# Patient Record
Sex: FEMALE | Race: WHITE
[De-identification: names, ages, dates, MRNs, and addresses within clinical notes are randomized per-mention and may not be internally consistent; named-entity substitution may affect disease eponyms.]

---

## 2019-10-07 ENCOUNTER — HOSPITAL ENCOUNTER (OUTPATIENT)
Dept: HOSPITAL 53 - M LAB REF | Age: 39
End: 2019-10-07
Attending: INTERNAL MEDICINE
Payer: COMMERCIAL

## 2019-10-07 DIAGNOSIS — R19.7: Primary | ICD-10-CM

## 2019-12-12 ENCOUNTER — HOSPITAL ENCOUNTER (OUTPATIENT)
Dept: HOSPITAL 53 - M LAB LCGH | Age: 39
End: 2019-12-12
Attending: OBSTETRICS & GYNECOLOGY
Payer: COMMERCIAL

## 2019-12-12 DIAGNOSIS — B37.3: ICD-10-CM

## 2019-12-12 DIAGNOSIS — Z80.49: Primary | ICD-10-CM

## 2020-07-11 ENCOUNTER — HOSPITAL ENCOUNTER (OUTPATIENT)
Dept: HOSPITAL 53 - M LABSMTC | Age: 40
End: 2020-07-11
Attending: PEDIATRICS
Payer: COMMERCIAL

## 2020-07-11 DIAGNOSIS — Z11.59: ICD-10-CM

## 2020-07-11 DIAGNOSIS — Z01.818: Primary | ICD-10-CM

## 2020-07-14 ENCOUNTER — HOSPITAL ENCOUNTER (OUTPATIENT)
Dept: HOSPITAL 53 - M OPP | Age: 40
Discharge: HOME | End: 2020-07-14
Attending: INTERNAL MEDICINE
Payer: COMMERCIAL

## 2020-07-14 VITALS — BODY MASS INDEX: 25.87 KG/M2 | HEIGHT: 63 IN | WEIGHT: 146 LBS

## 2020-07-14 VITALS — DIASTOLIC BLOOD PRESSURE: 70 MMHG | SYSTOLIC BLOOD PRESSURE: 125 MMHG

## 2020-07-14 DIAGNOSIS — Z91.040: ICD-10-CM

## 2020-07-14 DIAGNOSIS — Z87.891: ICD-10-CM

## 2020-07-14 DIAGNOSIS — Z88.8: ICD-10-CM

## 2020-07-14 DIAGNOSIS — Z79.899: ICD-10-CM

## 2020-07-14 DIAGNOSIS — R12: ICD-10-CM

## 2020-07-14 DIAGNOSIS — K21.9: ICD-10-CM

## 2020-07-14 DIAGNOSIS — K92.1: ICD-10-CM

## 2020-07-14 DIAGNOSIS — K59.00: Primary | ICD-10-CM

## 2020-07-14 DIAGNOSIS — Z88.0: ICD-10-CM

## 2020-07-14 NOTE — ROOR
________________________________________________________________________________

Patient Name: Gabrielle Sousa            Procedure Date: 7/14/2020 11:43 AM

MRN: [id number]                          Account Number: [id number]

YOB: 1980               Age: 39

Room: [facility]                            Gender: Female

Note Status: Finalized                 

________________________________________________________________________________

 

Procedure:           Upper GI endoscopy

Indications:         Heartburn

Providers:           Kip NIELSEN MD

Referring MD:        Radha Sanders MD

Requesting Provider: 

Medicines:           Monitored Anesthesia Care

Complications:       No immediate complications.

________________________________________________________________________________

Procedure:           Pre-Anesthesia Assessment:

                     - The heart rate, respiratory rate, oxygen saturations, 

                     blood pressure, adequacy of pulmonary ventilation, and 

                     response to care were monitored throughout the procedure.

                     The Endoscope was introduced through the mouth, and 

                     advanced to the second part of duodenum. The upper GI 

                     endoscopy was accomplished without difficulty. The 

                     patient tolerated the procedure well.

                                                                                

Findings:

     The esophagus was normal.

     The stomach was normal.

     The examined duodenum was normal.

                                                                                

Impression:          - Normal esophagus.

                     - Normal stomach.

                     - Normal examined duodenum.

                     - No specimens collected.

Recommendation:      - Continue present medications.

                     - Observe patient's clinical course.

                     - Follow an antireflux regimen.

                                                                                

 

Kip Nielsen MD

________________

Kip NIELSEN MD

7/14/2020 11:55:22 AM

Electronically signed by Kip NIELSEN MD

Number of Addenda: 0

 

Note Initiated On: 7/14/2020 11:43 AM

Estimated Blood Loss:

     Estimated blood loss: none.

## 2020-07-14 NOTE — ROOR
________________________________________________________________________________

Patient Name: Gabrielle Sousa            Procedure Date: 7/14/2020 11:44 AM

MRN: [id number]                          Account Number: [id number]

YOB: 1980               Age: 39

Room: [facility]                            Gender: Female

Note Status: Finalized                 

________________________________________________________________________________

 

Procedure:           Colonoscopy

Indications:         Hematochezia, Constipation

Providers:           Kip NIELSEN MD

Referring MD:        Radha Sanders MD

Requesting Provider: 

Medicines:           Monitored Anesthesia Care

Complications:       No immediate complications.

________________________________________________________________________________

Procedure:           Pre-Anesthesia Assessment:

                     - The heart rate, respiratory rate, oxygen saturations, 

                     blood pressure, adequacy of pulmonary ventilation, and 

                     response to care were monitored throughout the procedure.

                     The Colonoscope was introduced through the anus and 

                     advanced to 10 cm into the ileum. The colonoscopy was 

                     performed with difficulty due to unsatisfactory bowel 

                     prep. The patient tolerated the procedure well. The 

                     quality of the bowel preparation was poor.

                                                                                

Findings:

     The perianal and digital rectal examinations were normal.

     The colon is grossly normal without large tumors or obstructing lesions. 

     Unable to perform adequate detail examination.

                                                                                

Impression:          - Preparation of the colon was poor.

                     - The colon is grossly normal without large tumors or 

                     obstructing lesions. Unable to perform adequate detail 

                     examination.

Recommendation:      - Repeat colonoscopy at the next available appointment 

                     because the bowel preparation was poor.

                     - My office will call you to reschedule the procedure.

                                                                                

 

Kip Nielsen MD

________________

Kip NIELSEN MD

7/14/2020 12:14:19 PM

Electronically signed by Kip NIELSEN MD

Number of Addenda: 0

 

Note Initiated On: 7/14/2020 11:44 AM

Estimated Blood Loss:

     Estimated blood loss: none.

## 2020-08-11 ENCOUNTER — HOSPITAL ENCOUNTER (OUTPATIENT)
Dept: HOSPITAL 53 - M LAB REF | Age: 40
End: 2020-08-11
Attending: INTERNAL MEDICINE
Payer: COMMERCIAL

## 2020-08-11 DIAGNOSIS — R31.9: Primary | ICD-10-CM

## 2020-09-13 LAB
BACTERIA UR QL AUTO: NEGATIVE
MUCOUS THREADS URNS QL MICRO: (no result)
RBC # UR AUTO: 1 /HPF (ref 0–3)
SQUAMOUS #/AREA URNS AUTO: 5 /HPF (ref 0–6)
WBC #/AREA URNS AUTO: 1 /HPF (ref 0–3)

## 2020-11-09 ENCOUNTER — HOSPITAL ENCOUNTER (OUTPATIENT)
Dept: HOSPITAL 53 - M LAB REF | Age: 40
End: 2020-11-09
Attending: PHYSICIAN ASSISTANT
Payer: COMMERCIAL

## 2020-11-09 DIAGNOSIS — D23.5: Primary | ICD-10-CM

## 2021-05-20 ENCOUNTER — HOSPITAL ENCOUNTER (OUTPATIENT)
Dept: HOSPITAL 53 - M LABSMTC | Age: 41
End: 2021-05-20
Attending: ANESTHESIOLOGY
Payer: COMMERCIAL

## 2021-05-20 DIAGNOSIS — Z01.812: Primary | ICD-10-CM

## 2021-05-20 DIAGNOSIS — Z11.52: ICD-10-CM

## 2021-05-25 ENCOUNTER — HOSPITAL ENCOUNTER (OUTPATIENT)
Dept: HOSPITAL 53 - M OPP | Age: 41
Discharge: HOME | End: 2021-05-25
Attending: INTERNAL MEDICINE
Payer: COMMERCIAL

## 2021-05-25 VITALS — SYSTOLIC BLOOD PRESSURE: 106 MMHG | DIASTOLIC BLOOD PRESSURE: 56 MMHG

## 2021-05-25 VITALS — HEIGHT: 63 IN | BODY MASS INDEX: 26.22 KG/M2 | WEIGHT: 148 LBS

## 2021-05-25 DIAGNOSIS — G43.909: ICD-10-CM

## 2021-05-25 DIAGNOSIS — R10.84: Primary | ICD-10-CM

## 2021-05-25 DIAGNOSIS — K92.1: ICD-10-CM

## 2021-05-25 DIAGNOSIS — Z79.899: ICD-10-CM

## 2021-05-25 DIAGNOSIS — K21.9: ICD-10-CM

## 2021-05-25 DIAGNOSIS — F43.10: ICD-10-CM

## 2021-05-25 DIAGNOSIS — F32.9: ICD-10-CM

## 2021-05-25 DIAGNOSIS — Z83.3: ICD-10-CM

## 2021-05-25 DIAGNOSIS — Z80.3: ICD-10-CM

## 2021-05-25 DIAGNOSIS — K64.8: ICD-10-CM

## 2021-05-25 DIAGNOSIS — Z91.040: ICD-10-CM

## 2021-05-25 DIAGNOSIS — K58.1: ICD-10-CM

## 2021-05-25 DIAGNOSIS — K63.5: ICD-10-CM

## 2021-05-25 DIAGNOSIS — J45.909: ICD-10-CM

## 2021-05-25 DIAGNOSIS — Z87.891: ICD-10-CM

## 2021-05-25 DIAGNOSIS — Z88.8: ICD-10-CM

## 2021-05-25 DIAGNOSIS — F41.9: ICD-10-CM

## 2021-05-25 DIAGNOSIS — Z88.6: ICD-10-CM

## 2021-05-25 NOTE — ROOR
________________________________________________________________________________

Patient Name: Gabrielle Sousa            Procedure Date: 5/25/2021 9:42 AM

MRN: [id number]                          Account Number: [id number]

YOB: 1980               Age: 40

Room: [facility]                            Gender: Female

Note Status: Finalized                 

________________________________________________________________________________

 

Procedure:            Colonoscopy

Indications:          Generalized abdominal pain, Hematochezia, Irritable 

                      bowel syndrome with constipation

Providers:            Kip Nielsen MD

Referring MD:         Radha Sanders MD

Requesting Provider:  

Medicines:            Monitored Anesthesia Care

Complications:        No immediate complications.

________________________________________________________________________________

Procedure:            Pre-Anesthesia Assessment:

                      - The heart rate, respiratory rate, oxygen saturations, 

                      blood pressure, adequacy of pulmonary ventilation, and 

                      response to care were monitored throughout the procedure.

                      The Colonoscope was introduced through the anus and 

                      advanced to 10 cm into the ileum. The colonoscopy was 

                      performed without difficulty. The patient tolerated the 

                      procedure well. The quality of the bowel preparation was 

                      excellent. (Previous bowel preps were repeatedly poor, 

                      we used "Neurogenic prep" for this colonoscopy). The 

                      bowel preparation used was Miralax via split dose, 

                      magnesium citrate via single dose and Gavilyte via split 

                      dose instruction.

                                                                                

Findings:

     The perianal and digital rectal examinations were normal.

     A 4 mm polyp was found in the distal sigmoid colon. The polyp was 

     sessile. The polyp was removed with a cold snare. Resection and retrieval 

     were complete.

     Internal hemorrhoids were found during retroflexion. The hemorrhoids were 

     medium-sized.

     Retroflexion in the right colon was performed.

     The exam was otherwise normal throughout the examined colon.

     The terminal ileum appeared normal.

                                                                                

Impression:           - One 4 mm polyp in the distal sigmoid colon, removed 

                      with a cold snare. Resected and retrieved.

                      - Internal hemorrhoids.

                      - The rest of the colon and the terminal ileum are 

                      otherwise normal.

Recommendation:       - Telephone endoscopist for pathology results in 2 weeks.

                      - Await pathology results.

                      - If the pathology report reveals adenomatous tissue, 

                      then repeat the colonoscopy for surveillance in 5 years.

                      - Continue/Use Lactulose at 1-2 tbsp PO bid to tid.

                                                                                

Procedure Code(s):    --- Professional ---

                      05050, Colonoscopy, flexible; with removal of tumor(s), 

                      polyp(s), or other lesion(s) by snare technique

Diagnosis Code(s):    --- Professional ---

                      K58.1, Irritable bowel syndrome with constipation

                      K92.1, Melena (includes Hematochezia)

                      R10.84, Generalized abdominal pain

                      K64.8, Other hemorrhoids

                      K63.5, Polyp of colon

 

CPT copyright 2019 [employer]. All rights reserved.

 

The codes documented in this report are preliminary and upon  review may 

be revised to meet current compliance requirements.

 

Kip Nielsen MD

________________

Kip Nielsen MD

5/25/2021 10:12:34 AM

Electronically signed by Kip Nielsen MD

Number of Addenda: 0

 

Note Initiated On: 5/25/2021 9:42 AM

Estimated Blood Loss: Estimated blood loss: none.

## 2021-05-27 ENCOUNTER — HOSPITAL ENCOUNTER (OUTPATIENT)
Dept: HOSPITAL 53 - M LAB REF | Age: 41
End: 2021-05-27
Attending: INTERNAL MEDICINE
Payer: COMMERCIAL

## 2021-05-27 DIAGNOSIS — N18.2: Primary | ICD-10-CM

## 2021-05-27 DIAGNOSIS — R31.9: ICD-10-CM

## 2021-05-27 DIAGNOSIS — Z87.442: ICD-10-CM

## 2021-09-16 ENCOUNTER — HOSPITAL ENCOUNTER (OUTPATIENT)
Dept: HOSPITAL 53 - M LAB REF | Age: 41
End: 2021-09-16
Attending: PHYSICIAN ASSISTANT
Payer: COMMERCIAL

## 2021-09-16 DIAGNOSIS — D23.72: Primary | ICD-10-CM

## 2021-12-22 ENCOUNTER — HOSPITAL ENCOUNTER (OUTPATIENT)
Dept: HOSPITAL 53 - M LAB REF | Age: 41
End: 2021-12-22
Attending: NURSE PRACTITIONER
Payer: COMMERCIAL

## 2021-12-22 DIAGNOSIS — N18.2: Primary | ICD-10-CM

## 2022-10-27 ENCOUNTER — HOSPITAL ENCOUNTER (EMERGENCY)
Dept: HOSPITAL 53 - M ED | Age: 42
Discharge: LEFT BEFORE DISCHARGE | End: 2022-10-27
Payer: COMMERCIAL

## 2022-10-27 VITALS — SYSTOLIC BLOOD PRESSURE: 121 MMHG | DIASTOLIC BLOOD PRESSURE: 65 MMHG

## 2022-10-27 VITALS — BODY MASS INDEX: 23.63 KG/M2 | WEIGHT: 133.38 LBS | HEIGHT: 63 IN

## 2022-10-27 DIAGNOSIS — N18.9: ICD-10-CM

## 2022-10-27 DIAGNOSIS — R73.03: ICD-10-CM

## 2022-10-27 DIAGNOSIS — R30.0: ICD-10-CM

## 2022-10-27 DIAGNOSIS — Z90.49: ICD-10-CM

## 2022-10-27 DIAGNOSIS — Z91.040: ICD-10-CM

## 2022-10-27 DIAGNOSIS — Z87.442: ICD-10-CM

## 2022-10-27 DIAGNOSIS — Z90.710: ICD-10-CM

## 2022-10-27 DIAGNOSIS — Z53.20: ICD-10-CM

## 2022-10-27 DIAGNOSIS — R10.9: Primary | ICD-10-CM

## 2022-10-27 DIAGNOSIS — Z88.6: ICD-10-CM

## 2022-10-27 DIAGNOSIS — Z88.1: ICD-10-CM

## 2022-10-27 DIAGNOSIS — Z79.899: ICD-10-CM

## 2022-10-27 LAB
ALBUMIN SERPL BCG-MCNC: 3.8 GM/DL (ref 3.2–5.2)
ALT SERPL W P-5'-P-CCNC: 54 U/L (ref 12–78)
AMYLASE SERPL-CCNC: 49 U/L (ref 25–115)
BASOPHILS # BLD AUTO: 0 10^3/UL (ref 0–0.2)
BASOPHILS NFR BLD AUTO: 0.2 % (ref 0–1)
BILIRUB CONJ SERPL-MCNC: 0.1 MG/DL (ref 0–0.2)
BILIRUB SERPL-MCNC: 0.4 MG/DL (ref 0.2–1)
BUN SERPL-MCNC: 15 MG/DL (ref 7–18)
CALCIUM SERPL-MCNC: 9.2 MG/DL (ref 8.5–10.1)
CHLORIDE SERPL-SCNC: 103 MEQ/L (ref 98–107)
CO2 SERPL-SCNC: 28 MEQ/L (ref 21–32)
CREAT SERPL-MCNC: 0.66 MG/DL (ref 0.55–1.3)
EOSINOPHIL # BLD AUTO: 0 10^3/UL (ref 0–0.5)
EOSINOPHIL NFR BLD AUTO: 0.3 % (ref 0–3)
GFR SERPL CREATININE-BSD FRML MDRD: > 60 ML/MIN/{1.73_M2} (ref 58–?)
GLUCOSE SERPL-MCNC: 104 MG/DL (ref 70–100)
HCT VFR BLD AUTO: 39.8 % (ref 36–47)
HGB BLD-MCNC: 13.8 G/DL (ref 12–15.5)
LIPASE SERPL-CCNC: 145 U/L (ref 73–393)
LYMPHOCYTES # BLD AUTO: 1.5 10^3/UL (ref 1.5–5)
LYMPHOCYTES NFR BLD AUTO: 12.6 % (ref 24–44)
MCH RBC QN AUTO: 30.7 PG (ref 27–33)
MCHC RBC AUTO-ENTMCNC: 34.7 G/DL (ref 32–36.5)
MCV RBC AUTO: 88.6 FL (ref 80–96)
MONOCYTES # BLD AUTO: 0.5 10^3/UL (ref 0–0.8)
MONOCYTES NFR BLD AUTO: 4.7 % (ref 2–8)
NEUTROPHILS # BLD AUTO: 9.5 10^3/UL (ref 1.5–8.5)
NEUTROPHILS NFR BLD AUTO: 81.9 % (ref 36–66)
PLATELET # BLD AUTO: 339 10^3/UL (ref 150–450)
POTASSIUM SERPL-SCNC: 4.3 MEQ/L (ref 3.5–5.1)
PROT SERPL-MCNC: 7.6 GM/DL (ref 6.4–8.2)
RBC # BLD AUTO: 4.49 10^6/UL (ref 4–5.4)
SODIUM SERPL-SCNC: 137 MEQ/L (ref 136–145)
WBC # BLD AUTO: 11.6 10^3/UL (ref 4–10)

## 2023-10-11 ENCOUNTER — HOSPITAL ENCOUNTER (OUTPATIENT)
Dept: HOSPITAL 53 - M LAB REF | Age: 43
End: 2023-10-11
Attending: PHYSICIAN ASSISTANT
Payer: COMMERCIAL

## 2023-10-11 DIAGNOSIS — R50.9: Primary | ICD-10-CM

## 2023-10-11 DIAGNOSIS — N39.0: ICD-10-CM

## 2023-10-11 LAB
APPEARANCE UR: (no result)
BACTERIA UR QL AUTO: NEGATIVE
BILIRUB UR QL STRIP.AUTO: NEGATIVE
CAOX CRY URNS QL MICRO: (no result)
GLUCOSE UR QL STRIP.AUTO: NEGATIVE MG/DL
HGB UR QL STRIP.AUTO: NEGATIVE
KETONES UR QL STRIP.AUTO: NEGATIVE MG/DL
LEUKOCYTE ESTERASE UR QL STRIP.AUTO: NEGATIVE
MUCOUS THREADS URNS QL MICRO: (no result)
NITRITE UR QL STRIP.AUTO: NEGATIVE
PH UR STRIP.AUTO: 5 UNITS (ref 5–9)
PROT UR QL STRIP.AUTO: NEGATIVE MG/DL
RBC # UR AUTO: 1 /HPF (ref 0–3)
SP GR UR STRIP.AUTO: 1.03 (ref 1–1.03)
SQUAMOUS #/AREA URNS AUTO: 3 /HPF (ref 0–6)
UROBILINOGEN UR QL STRIP.AUTO: 0.2 MG/DL (ref 0–2)
WBC #/AREA URNS AUTO: 1 /HPF (ref 0–3)

## 2025-07-22 ENCOUNTER — HOSPITAL ENCOUNTER (OUTPATIENT)
Dept: HOSPITAL 53 - M SFHCPLAZ | Age: 45
End: 2025-07-22
Attending: INTERNAL MEDICINE
Payer: COMMERCIAL

## 2025-07-22 DIAGNOSIS — R30.0: Primary | ICD-10-CM

## 2025-07-22 LAB
AMORPH SED URNS QL MICRO: (no result)
AMORPH URATE CRY URNS QL MICRO: (no result)
APPEARANCE UR: CLEAR
BACTERIA UR QL AUTO: NEGATIVE
BILIRUB UR QL STRIP.AUTO: NEGATIVE
CAOX CRY URNS QL MICRO: (no result)
COLOR UR AUTO: (no result)
GLUCOSE UR QL STRIP.AUTO: NEGATIVE MG/DL
GRAN CASTS URNS QL MICRO: (no result) /LPF
HGB UR QL STRIP.AUTO: NEGATIVE
KETONES UR QL STRIP.AUTO: NEGATIVE MG/DL
LEUKOCYTE ESTERASE UR QL STRIP.AUTO: NEGATIVE
MUCOUS THREADS URNS QL MICRO: (no result)
NITRITE UR QL STRIP.AUTO: NEGATIVE
PH UR STRIP.AUTO: 6 UNITS (ref 5–9)
PROT UR QL STRIP.AUTO: NEGATIVE MG/DL
RBC # UR AUTO: 0 /HPF (ref 0–3)
RENAL EPI CELLS #/AREA URNS HPF: (no result) /HPF
SP GR UR STRIP.AUTO: 1.01 (ref 1–1.03)
SQUAMOUS #/AREA URNS AUTO: 1 /HPF (ref 0–6)
TRANS CELLS #/AREA URNS HPF: (no result) /HPF
TRI-PHOS CRY URNS QL MICRO: (no result)
UROBILINOGEN UR QL STRIP.AUTO: 2 MG/DL (ref 0–2)
WAXY CASTS #/AREA UR COMP ASSIST: (no result) /LPF
WBC #/AREA URNS AUTO: (no result) /HPF (ref ?–1)
WBC #/AREA URNS AUTO: 0 /HPF (ref 0–3)
YEAST UR QL AUTO: (no result)